# Patient Record
(demographics unavailable — no encounter records)

---

## 2024-10-29 NOTE — HISTORY OF PRESENT ILLNESS
[FreeTextEntry1] : follow up [de-identified] : 21 yo female presents with concern for anxiety. She is feeling stressed about general concerns such as employment/future/education. She would like to speak with a therapist. In addition, she is following up on hyperglycemia, low HDL. Denies fever, chills, cp, palpitations, sob, nvcd.

## 2024-10-29 NOTE — ASSESSMENT
[FreeTextEntry1] : Anxiety: recommend exercise, yoga, meditation, refer to sw/therapist Low HDL: lipid profile 9/24 reviewed, low HDL, Recommend low fat diet, wt loss, exercise, and DASH diet, repeat in 6 months Hyperglycemia: Recommend low carb diet, wt loss, exercise, f/u a1c ordered  RTC 4 wks

## 2025-02-19 NOTE — HISTORY OF PRESENT ILLNESS
[FreeTextEntry8] : Pt. c/o anxiety. Pt . c/o bloating and stomach pain.   22 yo female c 4d Hx of post prandial abdominal bloating/discomfort  no N/V     pain localized to epigastric region Denies increased eructation   BM daily, loose stool x 1 yesterday post intake of  OTC Gas-X  no bloody/black stools  no urinary complaints   Pt states had [employer] cake on Sunday

## 2025-02-19 NOTE — PHYSICAL EXAM
[No Acute Distress] : no acute distress [EOMI] : extraocular movements intact [Normal Outer Ear/Nose] : the outer ears and nose were normal in appearance [No JVD] : no jugular venous distention [No Respiratory Distress] : no respiratory distress  [No Accessory Muscle Use] : no accessory muscle use [Clear to Auscultation] : lungs were clear to auscultation bilaterally [Normal Rate] : normal rate  [Regular Rhythm] : with a regular rhythm [Normal S1, S2] : normal S1 and S2 [No Carotid Bruits] : no carotid bruits [No Edema] : there was no peripheral edema [No CVA Tenderness] : no CVA  tenderness [Grossly Normal Strength/Tone] : grossly normal strength/tone [No Rash] : no rash [Coordination Grossly Intact] : coordination grossly intact [No Focal Deficits] : no focal deficits [Normal Gait] : normal gait [Normal Affect] : the affect was normal [Normal Mood] : the mood was normal [Normal Insight/Judgement] : insight and judgment were intact [de-identified] : soft Mild epigastric discomfort to palpation no CVAT B/L

## 2025-02-19 NOTE — PLAN
[FreeTextEntry1] : see lab orders see radiology orders  (if abd pain persists post 7d and/or worsens) see med orders   Food Journal advised  no dairy x 1 week    OTC Chelated Mg++ 100mg bid  c/u c PCP 1M post start  referred to Psychotherapy Laney Florentino

## 2025-07-23 NOTE — ASSESSMENT
[FreeTextEntry1] : Anxiety/depression: no suicidal/homicidal ideation, recommend exercise, yoga, meditation, start escitalopram 10 mg po daily RTC 4 wks

## 2025-07-23 NOTE — HISTORY OF PRESENT ILLNESS
[FreeTextEntry8] : 24 yo female presents with complaint of anxiety/depression. Reports feeling depressed, reports trouble sleeping, low energy, loss of interest, difficulty concentrating, poor appetite. No suicidal/homicidal ideation. Denies fever, chills, cp, sob, nvcd.